# Patient Record
Sex: FEMALE | Race: ASIAN | Employment: PART TIME | URBAN - METROPOLITAN AREA
[De-identification: names, ages, dates, MRNs, and addresses within clinical notes are randomized per-mention and may not be internally consistent; named-entity substitution may affect disease eponyms.]

---

## 2017-02-18 PROCEDURE — 84144 ASSAY OF PROGESTERONE: CPT | Performed by: OBSTETRICS & GYNECOLOGY

## 2017-03-28 PROBLEM — Z98.891 PREVIOUS CESAREAN SECTION: Status: ACTIVE | Noted: 2017-03-28

## 2017-03-28 PROCEDURE — 87591 N.GONORRHOEAE DNA AMP PROB: CPT | Performed by: OBSTETRICS & GYNECOLOGY

## 2017-03-28 PROCEDURE — 36415 COLL VENOUS BLD VENIPUNCTURE: CPT | Performed by: OBSTETRICS & GYNECOLOGY

## 2017-03-28 PROCEDURE — 85025 COMPLETE CBC W/AUTO DIFF WBC: CPT | Performed by: OBSTETRICS & GYNECOLOGY

## 2017-03-28 PROCEDURE — 87491 CHLMYD TRACH DNA AMP PROBE: CPT | Performed by: OBSTETRICS & GYNECOLOGY

## 2017-03-28 PROCEDURE — 83036 HEMOGLOBIN GLYCOSYLATED A1C: CPT | Performed by: OBSTETRICS & GYNECOLOGY

## 2017-03-28 PROCEDURE — 86780 TREPONEMA PALLIDUM: CPT | Performed by: OBSTETRICS & GYNECOLOGY

## 2017-03-28 PROCEDURE — 86850 RBC ANTIBODY SCREEN: CPT | Performed by: OBSTETRICS & GYNECOLOGY

## 2017-03-28 PROCEDURE — 88175 CYTOPATH C/V AUTO FLUID REDO: CPT | Performed by: OBSTETRICS & GYNECOLOGY

## 2017-03-28 PROCEDURE — 86762 RUBELLA ANTIBODY: CPT | Performed by: OBSTETRICS & GYNECOLOGY

## 2017-03-28 PROCEDURE — 86900 BLOOD TYPING SEROLOGIC ABO: CPT | Performed by: OBSTETRICS & GYNECOLOGY

## 2017-03-28 PROCEDURE — 86901 BLOOD TYPING SEROLOGIC RH(D): CPT | Performed by: OBSTETRICS & GYNECOLOGY

## 2017-03-28 PROCEDURE — 87086 URINE CULTURE/COLONY COUNT: CPT | Performed by: OBSTETRICS & GYNECOLOGY

## 2017-03-28 PROCEDURE — 87389 HIV-1 AG W/HIV-1&-2 AB AG IA: CPT | Performed by: OBSTETRICS & GYNECOLOGY

## 2017-03-28 PROCEDURE — 87340 HEPATITIS B SURFACE AG IA: CPT | Performed by: OBSTETRICS & GYNECOLOGY

## 2017-07-25 PROCEDURE — 82950 GLUCOSE TEST: CPT | Performed by: OBSTETRICS & GYNECOLOGY

## 2017-07-25 PROCEDURE — 36415 COLL VENOUS BLD VENIPUNCTURE: CPT | Performed by: OBSTETRICS & GYNECOLOGY

## 2017-08-01 PROCEDURE — 82952 GTT-ADDED SAMPLES: CPT | Performed by: OBSTETRICS & GYNECOLOGY

## 2017-08-01 PROCEDURE — 82951 GLUCOSE TOLERANCE TEST (GTT): CPT | Performed by: OBSTETRICS & GYNECOLOGY

## 2017-08-01 PROCEDURE — 86780 TREPONEMA PALLIDUM: CPT | Performed by: OBSTETRICS & GYNECOLOGY

## 2017-08-23 PROBLEM — O24.410 DIET CONTROLLED GESTATIONAL DIABETES MELLITUS (GDM) IN THIRD TRIMESTER: Status: ACTIVE | Noted: 2017-08-23

## 2017-08-23 PROBLEM — O09.93 SUPERVISION OF HIGH RISK PREGNANCY IN THIRD TRIMESTER: Status: ACTIVE | Noted: 2017-08-23

## 2017-10-04 PROCEDURE — 87653 STREP B DNA AMP PROBE: CPT | Performed by: OBSTETRICS & GYNECOLOGY

## 2017-10-04 PROCEDURE — 87081 CULTURE SCREEN ONLY: CPT | Performed by: OBSTETRICS & GYNECOLOGY

## 2017-10-28 ENCOUNTER — ANESTHESIA (OUTPATIENT)
Dept: OBGYN UNIT | Facility: HOSPITAL | Age: 32
End: 2017-10-28

## 2017-10-28 ENCOUNTER — HOSPITAL ENCOUNTER (INPATIENT)
Facility: HOSPITAL | Age: 32
LOS: 2 days | Discharge: HOME | End: 2017-10-30
Attending: OBSTETRICS & GYNECOLOGY | Admitting: OBSTETRICS & GYNECOLOGY
Payer: COMMERCIAL

## 2017-10-28 ENCOUNTER — ANESTHESIA EVENT (OUTPATIENT)
Dept: OBGYN UNIT | Facility: HOSPITAL | Age: 32
End: 2017-10-28

## 2017-10-28 PROBLEM — Z34.90 PREGNANCY: Status: ACTIVE | Noted: 2017-10-28

## 2017-10-28 PROBLEM — Z34.90 PREGNANCY: Status: RESOLVED | Noted: 2017-10-28 | Resolved: 2017-10-28

## 2017-10-28 PROBLEM — O34.219 VBAC (VAGINAL BIRTH AFTER CESAREAN): Status: ACTIVE | Noted: 2017-10-28

## 2017-10-28 PROCEDURE — 86900 BLOOD TYPING SEROLOGIC ABO: CPT | Performed by: OBSTETRICS & GYNECOLOGY

## 2017-10-28 PROCEDURE — 86901 BLOOD TYPING SEROLOGIC RH(D): CPT | Performed by: OBSTETRICS & GYNECOLOGY

## 2017-10-28 PROCEDURE — 82962 GLUCOSE BLOOD TEST: CPT

## 2017-10-28 PROCEDURE — 86850 RBC ANTIBODY SCREEN: CPT | Performed by: OBSTETRICS & GYNECOLOGY

## 2017-10-28 PROCEDURE — 0KQM0ZZ REPAIR PERINEUM MUSCLE, OPEN APPROACH: ICD-10-PCS | Performed by: OBSTETRICS & GYNECOLOGY

## 2017-10-28 PROCEDURE — 36415 COLL VENOUS BLD VENIPUNCTURE: CPT

## 2017-10-28 PROCEDURE — 85027 COMPLETE CBC AUTOMATED: CPT | Performed by: OBSTETRICS & GYNECOLOGY

## 2017-10-28 PROCEDURE — 51701 INSERT BLADDER CATHETER: CPT

## 2017-10-28 RX ORDER — SODIUM CHLORIDE, SODIUM LACTATE, POTASSIUM CHLORIDE, CALCIUM CHLORIDE 600; 310; 30; 20 MG/100ML; MG/100ML; MG/100ML; MG/100ML
INJECTION, SOLUTION INTRAVENOUS CONTINUOUS
Status: DISCONTINUED | OUTPATIENT
Start: 2017-10-28 | End: 2017-10-28 | Stop reason: HOSPADM

## 2017-10-28 RX ORDER — NALBUPHINE HCL 10 MG/ML
2.5 AMPUL (ML) INJECTION
Status: DISCONTINUED | OUTPATIENT
Start: 2017-10-28 | End: 2017-10-30

## 2017-10-28 RX ORDER — LIDOCAINE HYDROCHLORIDE 10 MG/ML
INJECTION, SOLUTION EPIDURAL; INFILTRATION; INTRACAUDAL; PERINEURAL
Status: DISPENSED
Start: 2017-10-28 | End: 2017-10-29

## 2017-10-28 RX ORDER — LIDOCAINE HYDROCHLORIDE 10 MG/ML
INJECTION, SOLUTION EPIDURAL; INFILTRATION; INTRACAUDAL; PERINEURAL AS NEEDED
Status: DISCONTINUED | OUTPATIENT
Start: 2017-10-28 | End: 2017-10-28 | Stop reason: SURG

## 2017-10-28 RX ORDER — LIDOCAINE HYDROCHLORIDE 10 MG/ML
30 INJECTION, SOLUTION EPIDURAL; INFILTRATION; INTRACAUDAL; PERINEURAL ONCE
Status: DISCONTINUED | OUTPATIENT
Start: 2017-10-28 | End: 2017-10-28 | Stop reason: HOSPADM

## 2017-10-28 RX ORDER — SODIUM CHLORIDE, SODIUM LACTATE, POTASSIUM CHLORIDE, CALCIUM CHLORIDE 600; 310; 30; 20 MG/100ML; MG/100ML; MG/100ML; MG/100ML
INJECTION, SOLUTION INTRAVENOUS
Status: DISPENSED
Start: 2017-10-28 | End: 2017-10-29

## 2017-10-28 RX ORDER — ACETAMINOPHEN 325 MG/1
650 TABLET ORAL EVERY 6 HOURS PRN
Status: DISCONTINUED | OUTPATIENT
Start: 2017-10-28 | End: 2017-10-29

## 2017-10-28 RX ORDER — TERBUTALINE SULFATE 1 MG/ML
0.25 INJECTION, SOLUTION SUBCUTANEOUS AS NEEDED
Status: DISCONTINUED | OUTPATIENT
Start: 2017-10-28 | End: 2017-10-28 | Stop reason: HOSPADM

## 2017-10-28 RX ORDER — BUPIVACAINE HYDROCHLORIDE 2.5 MG/ML
INJECTION, SOLUTION EPIDURAL; INFILTRATION; INTRACAUDAL
Status: DISPENSED
Start: 2017-10-28 | End: 2017-10-29

## 2017-10-28 RX ORDER — SODIUM CHLORIDE 0.9 % (FLUSH) 0.9 %
10 SYRINGE (ML) INJECTION AS NEEDED
Status: DISCONTINUED | OUTPATIENT
Start: 2017-10-28 | End: 2017-10-28 | Stop reason: HOSPADM

## 2017-10-28 RX ORDER — DEXTROSE, SODIUM CHLORIDE, SODIUM LACTATE, POTASSIUM CHLORIDE, AND CALCIUM CHLORIDE 5; .6; .31; .03; .02 G/100ML; G/100ML; G/100ML; G/100ML; G/100ML
125 INJECTION, SOLUTION INTRAVENOUS CONTINUOUS
Status: DISCONTINUED | OUTPATIENT
Start: 2017-10-28 | End: 2017-10-28 | Stop reason: HOSPADM

## 2017-10-28 RX ORDER — AMMONIA INHALANTS 0.04 G/.3ML
0.3 INHALANT RESPIRATORY (INHALATION) AS NEEDED
Status: DISCONTINUED | OUTPATIENT
Start: 2017-10-28 | End: 2017-10-28 | Stop reason: HOSPADM

## 2017-10-28 RX ORDER — BUPIVACAINE HYDROCHLORIDE 2.5 MG/ML
INJECTION, SOLUTION EPIDURAL; INFILTRATION; INTRACAUDAL AS NEEDED
Status: DISCONTINUED | OUTPATIENT
Start: 2017-10-28 | End: 2017-10-28 | Stop reason: SURG

## 2017-10-28 RX ORDER — LIDOCAINE HYDROCHLORIDE AND EPINEPHRINE 15; 5 MG/ML; UG/ML
INJECTION, SOLUTION EPIDURAL AS NEEDED
Status: DISCONTINUED | OUTPATIENT
Start: 2017-10-28 | End: 2017-10-28 | Stop reason: SURG

## 2017-10-28 RX ORDER — SODIUM CHLORIDE, SODIUM LACTATE, POTASSIUM CHLORIDE, CALCIUM CHLORIDE 600; 310; 30; 20 MG/100ML; MG/100ML; MG/100ML; MG/100ML
INJECTION, SOLUTION INTRAVENOUS
Status: DISPENSED
Start: 2017-10-28 | End: 2017-10-28

## 2017-10-28 RX ORDER — IBUPROFEN 600 MG/1
600 TABLET ORAL ONCE AS NEEDED
Status: DISCONTINUED | OUTPATIENT
Start: 2017-10-28 | End: 2017-10-28 | Stop reason: HOSPADM

## 2017-10-28 RX ORDER — EPHEDRINE SULFATE/0.9% NACL/PF 25 MG/5 ML
5 SYRINGE (ML) INTRAVENOUS AS NEEDED
Status: DISCONTINUED | OUTPATIENT
Start: 2017-10-28 | End: 2017-10-29

## 2017-10-28 RX ORDER — TRISODIUM CITRATE DIHYDRATE AND CITRIC ACID MONOHYDRATE 500; 334 MG/5ML; MG/5ML
30 SOLUTION ORAL AS NEEDED
Status: DISCONTINUED | OUTPATIENT
Start: 2017-10-28 | End: 2017-10-28 | Stop reason: HOSPADM

## 2017-10-28 RX ADMIN — BUPIVACAINE HYDROCHLORIDE 0.2 ML: 2.5 INJECTION, SOLUTION EPIDURAL; INFILTRATION; INTRACAUDAL at 14:33:00

## 2017-10-28 RX ADMIN — SODIUM CHLORIDE, SODIUM LACTATE, POTASSIUM CHLORIDE, CALCIUM CHLORIDE: 600; 310; 30; 20 INJECTION, SOLUTION INTRAVENOUS at 14:28:00

## 2017-10-28 RX ADMIN — LIDOCAINE HYDROCHLORIDE 3 ML: 10 INJECTION, SOLUTION EPIDURAL; INFILTRATION; INTRACAUDAL; PERINEURAL at 14:33:00

## 2017-10-28 RX ADMIN — LIDOCAINE HYDROCHLORIDE AND EPINEPHRINE 3 ML: 15; 5 INJECTION, SOLUTION EPIDURAL at 14:35:00

## 2017-10-28 NOTE — ANESTHESIA PREPROCEDURE EVALUATION
Anesthesia PreOp Note    HPI:     Oscar Mccray is a 27year old female who presents for preoperative consultation requested by: * No surgeons listed *    Date of Surgery: 10/28/2017    * No procedures listed *  Indication: * No pre-op diagnosis entered * Test blood glucose daily times 4 and prn per drs orders Disp: 200 each Rfl: 3 Taking       Current Facility-Administered Medications Ordered in Epic:  Normal Saline Flush 0.9 % injection 10 mL 10 mL Intravenous PRN Allyn Sandifer, MD     dextrose 5 % Cherelle Tubbs fentaNYL 2mcg/ml & bupivacaine 1.25mg/ml epidural infusion  Epidural Continuous PRN Less, Sara Sesay MD Last Rate: 10 mL/hr at 10/28/17 1439 10 mL/hr at 10/28/17 1439   lactated ringers IV bolus 1,000 mL 1,000 mL Intravenous Once Less, Sara Sesay MD pressure is 118/54 and her pulse is 81.  Her respiration is 16.    10/28/17  1441 10/28/17  1443 10/28/17  1445 10/28/17  1446   BP: 125/84 113/58 118/54 118/54   BP Location:       Pulse: 91 111 81 81   Resp:       Temp:       TempSrc:            Anesthesi

## 2017-10-28 NOTE — H&P
[address] [facility] Patient Status:  Observation    1985 MRN [id number]   Location 9 [facility] Attending Ethan Flowers MD   Hosp Day # 0 PCP Melissa Giron MD     Date of Admission Disp:  Rfl:  10/27/2017 at Unknown time   Prenatal Vit-Fe Fumarate-FA (PRENATAL VITAMINS PLUS) 27-1 MG Oral Tab Take 1 tablet by mouth daily.  Disp:  Rfl:  10/27/2017 at Unknown time   Sapropterin Dihydrochloride (KUVAN) 100 MG Oral Powd Pack Take 100 mg by 181 140 - 400 K/UL   MPV 8.7 7.4 - 10.3 fL   -ABORH (BLOOD TYPE)   Collection Time: 10/28/17  5:44 AM   Result Value Ref Range   ABO BLOOD TYPE A    RH BLOOD TYPE Positive    -ANTIBODY SCREEN   Collection Time: 10/28/17  5:44 AM   Result Value Ref Range

## 2017-10-28 NOTE — ANESTHESIA PROCEDURE NOTES
Labor Analgesia  Performed by: LEATHA FAUST  Authorized by: LEATHA FAUST     Patient Location:  OB  Start Time:  10/28/2017 2:24 PM  End Time:  10/28/2017 2:39 PM  Site Identification: surface landmarks    Reason for Block: labor epidural    Anesthes

## 2017-10-28 NOTE — PROGRESS NOTES
[facility]    Labor Progress Note    Justin Ba Patient Status:  Observation    1985 MRN [id number]   Location [address]  Attending Mariah Mendoza MD   Hosp Day # 0 PCP Jeevan Womack MD       Subjective   I

## 2017-10-29 PROCEDURE — 85025 COMPLETE CBC W/AUTO DIFF WBC: CPT | Performed by: OBSTETRICS & GYNECOLOGY

## 2017-10-29 RX ORDER — IBUPROFEN 200 MG
200 TABLET ORAL EVERY 4 HOURS PRN
Status: DISCONTINUED | OUTPATIENT
Start: 2017-10-29 | End: 2017-10-30

## 2017-10-29 RX ORDER — SIMETHICONE 80 MG
80 TABLET,CHEWABLE ORAL 3 TIMES DAILY PRN
Status: DISCONTINUED | OUTPATIENT
Start: 2017-10-29 | End: 2017-10-30

## 2017-10-29 RX ORDER — IBUPROFEN 200 MG
400 TABLET ORAL EVERY 4 HOURS PRN
Status: DISCONTINUED | OUTPATIENT
Start: 2017-10-29 | End: 2017-10-30

## 2017-10-29 RX ORDER — ONDANSETRON 2 MG/ML
4 INJECTION INTRAMUSCULAR; INTRAVENOUS EVERY 6 HOURS PRN
Status: DISCONTINUED | OUTPATIENT
Start: 2017-10-29 | End: 2017-10-30

## 2017-10-29 RX ORDER — PRENATAL VIT,CAL 76/IRON/FOLIC 29 MG-1 MG
1 TABLET ORAL DAILY
Status: DISCONTINUED | OUTPATIENT
Start: 2017-10-29 | End: 2017-10-30

## 2017-10-29 RX ORDER — IBUPROFEN 600 MG/1
600 TABLET ORAL EVERY 4 HOURS PRN
Status: DISCONTINUED | OUTPATIENT
Start: 2017-10-29 | End: 2017-10-30

## 2017-10-29 RX ORDER — DOCUSATE SODIUM 100 MG/1
100 CAPSULE, LIQUID FILLED ORAL 2 TIMES DAILY
Status: DISCONTINUED | OUTPATIENT
Start: 2017-10-29 | End: 2017-10-30

## 2017-10-29 RX ORDER — SODIUM CHLORIDE 0.9 % (FLUSH) 0.9 %
10 SYRINGE (ML) INJECTION AS NEEDED
Status: DISCONTINUED | OUTPATIENT
Start: 2017-10-29 | End: 2017-10-30

## 2017-10-29 RX ORDER — AMMONIA INHALANTS 0.04 G/.3ML
0.3 INHALANT RESPIRATORY (INHALATION) AS NEEDED
Status: DISCONTINUED | OUTPATIENT
Start: 2017-10-29 | End: 2017-10-30

## 2017-10-29 RX ORDER — BISACODYL 10 MG
10 SUPPOSITORY, RECTAL RECTAL ONCE AS NEEDED
Status: DISCONTINUED | OUTPATIENT
Start: 2017-10-29 | End: 2017-10-30

## 2017-10-29 NOTE — L&D DELIVERY NOTE
Gala Rueda, Girl  [[id number]]     Labor Events     labor?:  No    steroids?:  None   Antibiotics received during labor?:  No   Rupture date:  10/28/17   Rupture time:  010   Rupture type:  SROM   Fluid color:  Clear   Augmentation:  Oxytocin   I effort: Total:                                                                 Apgars assigned by:  SHER ALONSO RN   Byron disposition:  with mother          Skin to Skin    Skin to skin initiation date/time: 10/28/17 1936   Skin to skin with:   Mother

## 2017-10-29 NOTE — PLAN OF CARE
ANXIETY    • Will report anxiety at manageable levels Progressing        DISCHARGE PLANNING    • Discharge to home or other facility with appropriate resources Progressing        PAIN - ADULT    • Verbalizes/displays adequate comfort level or patient's sta

## 2017-10-29 NOTE — PROGRESS NOTES
[facility]    OB/GYNE Progress Note      Bailey Channel Patient Status:  Inpatient    1985 MRN [id number]   Location [facility] 3SE Attending Roberta Suarez MD   Hosp Day # 1 PCP Destinee Vaughn MD       Assessment/Plan     Assessme

## 2017-10-30 VITALS
SYSTOLIC BLOOD PRESSURE: 119 MMHG | RESPIRATION RATE: 18 BRPM | HEART RATE: 64 BPM | TEMPERATURE: 98 F | DIASTOLIC BLOOD PRESSURE: 41 MMHG

## 2017-10-30 PROBLEM — O24.410 DIET CONTROLLED GESTATIONAL DIABETES MELLITUS (GDM) IN THIRD TRIMESTER: Status: RESOLVED | Noted: 2017-08-23 | Resolved: 2017-10-28

## 2017-10-30 PROBLEM — Z98.891 PREVIOUS CESAREAN SECTION: Status: RESOLVED | Noted: 2017-03-28 | Resolved: 2017-10-28

## 2017-10-30 PROBLEM — O34.219 VBAC (VAGINAL BIRTH AFTER CESAREAN): Status: RESOLVED | Noted: 2017-10-28 | Resolved: 2017-10-28

## 2017-10-30 RX ORDER — IBUPROFEN 600 MG/1
TABLET ORAL
Qty: 60 TABLET | Refills: 0 | Status: SHIPPED | COMMUNITY
Start: 2017-10-30 | End: 2020-12-15

## 2017-10-30 NOTE — DISCHARGE SUMMARY
[facility] - [facility]    Obstetrical Discharge Summary    Creston Felty Patient Status:  Inpatient    1985 MRN [id number]   Location [facility] 3SE Attending Edwige Carmona MD   [facility] Day # 2 PCP Edsi Gallegos MD     Date of Admission: 10/ deferred  Extremities: Homans sign is negative, no sign of DVT      Results:     Recent Results (from the past 336 hour(s))  -POCT GLUCOSE   Collection Time: 10/28/17  5:25 AM   Result Value Ref Range   POC Glucose  111 (H) 70 - 99   -CBC, PLATELET; NO DIF Location Status    [id number] 10/28/17  SECTION Ree Ivey MD [facility] L+D OR Not [place]          Pending Labs:     Discharge Plan:   Discharge Condition: Good    Discharge Meds: Current Discharge Medication List    New Orders    ibuprofen 600 MG Oral T

## 2017-10-30 NOTE — PROGRESS NOTES
Discharge order received from md. Postpartum discharge folder given, discharge medication form reviewed, signed, and given to patient. Id bands matched with baby band. Patient informed with to  Make follow appointment with ob.  Mother is interacting approp

## 2017-10-30 NOTE — PROGRESS NOTES
[facility] - [facility]    OB/Gyne Post  Progress Note      Creston Felty Patient Status:  Inpatient    1985 MRN [id number]   Location [facility] 3SE Attending Edwige Carmona MD   Hosp Day # 2 PCP MD Kevin Aguayo

## 2017-10-30 NOTE — PLAN OF CARE
ANXIETY    • Will report anxiety at manageable levels Completed        BIRTH - VAGINAL/ SECTION    • Fetal and maternal status remain reassuring during the birth process Completed        DISCHARGE PLANNING    • Discharge to home or other facility w

## 2017-11-14 NOTE — ANESTHESIA POSTPROCEDURE EVALUATION
Patient: Lillian Nichols    Procedure Summary     Date:  10/28/17 Room / Location:      Anesthesia Start:  1424 Anesthesia Stop:  1938    Procedure:  LABOR ANALGESIA Diagnosis:      Scheduled Providers:   Anesthesiologist:  Uvaldo Duarte MD    Anesthesia Type

## 2017-11-15 ENCOUNTER — NURSE ONLY (OUTPATIENT)
Dept: LACTATION | Facility: HOSPITAL | Age: 32
End: 2017-11-15
Payer: COMMERCIAL

## 2017-11-15 DIAGNOSIS — O92.79 DISORDER OF LACTATION, POSTPARTUM CONDITION OR COMPLICATION: Primary | ICD-10-CM

## 2017-11-15 PROCEDURE — 99213 OFFICE O/P EST LOW 20 MIN: CPT

## 2017-11-15 NOTE — PROGRESS NOTES
Mom is concerned about infant feeding at 332 weeks of age and about 8 ounces over birth weight. Initially mom had pain with feeding that was at its worst 10/10. At today's visit mom's nipples were healed and there was no pain with feeding.  Mom's difficul

## 2017-11-15 NOTE — PATIENT INSTRUCTIONS
Infant Discharge Feeding Plan -      Snuggle your baby in skin to skin contact between and during feedings whenever possible. Massage your breasts before nursing or pumping to soften areola if needed.     Breastfeed with hunger cues, most babies wi Do I need to pump my breasts? If supplementing:  · . Each day pump at least the same number of times you have needed to supplement that day. Remember the helpful website to improve your production that helps [email or web address]/[email or web address]

## 2018-02-01 ENCOUNTER — TELEPHONE (OUTPATIENT)
Dept: OBGYN UNIT | Facility: HOSPITAL | Age: 33
End: 2018-02-01

## 2018-02-01 RX ORDER — CLINDAMYCIN HYDROCHLORIDE 300 MG/1
300 CAPSULE ORAL 3 TIMES DAILY
Qty: 30 CAPSULE | Refills: 0 | Status: SHIPPED | OUTPATIENT
Start: 2018-02-01 | End: 2018-02-11

## 2018-02-02 NOTE — TELEPHONE ENCOUNTER
Having fever, flulike symptopms past 48 hours with tenderness on R breast.  Body Chills. Rx. Clindamycin  Instructed if not improving in 48 hours, should be seen by [facility]. Patient verbalized understanding; all questions answered.

## 2018-05-18 PROCEDURE — 84144 ASSAY OF PROGESTERONE: CPT | Performed by: OBSTETRICS & GYNECOLOGY

## 2019-12-12 ENCOUNTER — WALK IN (OUTPATIENT)
Dept: URGENT CARE | Age: 34
End: 2019-12-12

## 2019-12-12 VITALS
WEIGHT: 140 LBS | HEIGHT: 64 IN | RESPIRATION RATE: 15 BRPM | BODY MASS INDEX: 23.9 KG/M2 | SYSTOLIC BLOOD PRESSURE: 122 MMHG | DIASTOLIC BLOOD PRESSURE: 76 MMHG | TEMPERATURE: 98.1 F | HEART RATE: 80 BPM

## 2019-12-12 DIAGNOSIS — J02.9 SORE THROAT: Primary | ICD-10-CM

## 2019-12-12 DIAGNOSIS — J06.9 VIRAL URI: ICD-10-CM

## 2019-12-12 LAB
INTERNAL PROCEDURAL CONTROLS ACCEPTABLE: YES
S PYO AG THROAT QL IA.RAPID: NEGATIVE

## 2019-12-12 PROCEDURE — 99203 OFFICE O/P NEW LOW 30 MIN: CPT | Performed by: NURSE PRACTITIONER

## 2019-12-12 PROCEDURE — 87880 STREP A ASSAY W/OPTIC: CPT | Performed by: NURSE PRACTITIONER

## 2019-12-12 ASSESSMENT — ENCOUNTER SYMPTOMS
WHEEZING: 0
HEADACHES: 0
RHINORRHEA: 1
COUGH: 1
EYE ITCHING: 0
SORE THROAT: 1
CHILLS: 1
TROUBLE SWALLOWING: 0
PHOTOPHOBIA: 0
EYE PAIN: 0
SHORTNESS OF BREATH: 0
ABDOMINAL PAIN: 0
NAUSEA: 0
SINUS PAIN: 0
VOMITING: 0
DIARRHEA: 0
FATIGUE: 1
SWOLLEN GLANDS: 0
SINUS PRESSURE: 0
ACTIVITY CHANGE: 1
EYE REDNESS: 1
EYE DISCHARGE: 0

## 2021-06-04 PROBLEM — Z86.32 HISTORY OF GESTATIONAL DIABETES: Status: ACTIVE | Noted: 2021-06-04

## 2023-04-06 LAB
ANTIBODY SCREEN OB: NEGATIVE
HEPATITIS B SURFACE ANTIGEN OB: NEGATIVE
RAPID PLASMA REAGIN OB: NONREACTIVE

## 2023-08-21 LAB
AMB EXT TREPONEMAL ANTIBODIES: NONREACTIVE
HIV RESULT OB: NEGATIVE

## 2023-10-24 LAB — AMB EXT STREP B CULTURE: NEGATIVE

## 2023-11-11 ENCOUNTER — HOSPITAL ENCOUNTER (INPATIENT)
Facility: HOSPITAL | Age: 38
LOS: 1 days | Discharge: HOME | End: 2023-11-12
Attending: OBSTETRICS & GYNECOLOGY | Admitting: OBSTETRICS & GYNECOLOGY
Payer: COMMERCIAL

## 2023-11-11 ENCOUNTER — ANESTHESIA (OUTPATIENT)
Dept: OBGYN UNIT | Facility: HOSPITAL | Age: 38
End: 2023-11-11
Payer: COMMERCIAL

## 2023-11-11 ENCOUNTER — ANESTHESIA EVENT (OUTPATIENT)
Dept: OBGYN UNIT | Facility: HOSPITAL | Age: 38
End: 2023-11-11
Payer: COMMERCIAL

## 2023-11-11 PROBLEM — O26.93 PREGNANCY RELATED CONDITION IN THIRD TRIMESTER: Status: ACTIVE | Noted: 2023-11-11

## 2023-11-11 LAB
ANTIBODY SCREEN: NEGATIVE
BASOPHILS # BLD AUTO: 0.04 X10(3) UL (ref 0–0.2)
BASOPHILS NFR BLD AUTO: 0.4 %
DEPRECATED RDW RBC AUTO: 42.9 FL (ref 35.1–46.3)
EOSINOPHIL # BLD AUTO: 0.07 X10(3) UL (ref 0–0.7)
EOSINOPHIL NFR BLD AUTO: 0.7 %
ERYTHROCYTE [DISTWIDTH] IN BLOOD BY AUTOMATED COUNT: 13.2 % (ref 11–15)
GLUCOSE BLDC GLUCOMTR-MCNC: 91 MG/DL (ref 70–99)
HCT VFR BLD AUTO: 41.2 %
HGB BLD-MCNC: 13.8 G/DL
IMM GRANULOCYTES # BLD AUTO: 0.11 X10(3) UL (ref 0–1)
IMM GRANULOCYTES NFR BLD: 1.1 %
LYMPHOCYTES # BLD AUTO: 1.31 X10(3) UL (ref 1–4)
LYMPHOCYTES NFR BLD AUTO: 13.4 %
MCH RBC QN AUTO: 30 PG (ref 26–34)
MCHC RBC AUTO-ENTMCNC: 33.5 G/DL (ref 31–37)
MCV RBC AUTO: 89.6 FL
MONOCYTES # BLD AUTO: 0.78 X10(3) UL (ref 0.1–1)
MONOCYTES NFR BLD AUTO: 8 %
NEUTROPHILS # BLD AUTO: 7.5 X10 (3) UL (ref 1.5–7.7)
NEUTROPHILS # BLD AUTO: 7.5 X10(3) UL (ref 1.5–7.7)
NEUTROPHILS NFR BLD AUTO: 76.4 %
PLATELET # BLD AUTO: 187 10(3)UL (ref 150–450)
RBC # BLD AUTO: 4.6 X10(6)UL
RH BLOOD TYPE: POSITIVE
WBC # BLD AUTO: 9.8 X10(3) UL (ref 4–11)

## 2023-11-11 PROCEDURE — 0HQ9XZZ REPAIR PERINEUM SKIN, EXTERNAL APPROACH: ICD-10-PCS | Performed by: OBSTETRICS & GYNECOLOGY

## 2023-11-11 PROCEDURE — 86901 BLOOD TYPING SEROLOGIC RH(D): CPT | Performed by: OBSTETRICS & GYNECOLOGY

## 2023-11-11 PROCEDURE — 86850 RBC ANTIBODY SCREEN: CPT | Performed by: OBSTETRICS & GYNECOLOGY

## 2023-11-11 PROCEDURE — 85025 COMPLETE CBC W/AUTO DIFF WBC: CPT | Performed by: OBSTETRICS & GYNECOLOGY

## 2023-11-11 PROCEDURE — 10907ZC DRAINAGE OF AMNIOTIC FLUID, THERAPEUTIC FROM PRODUCTS OF CONCEPTION, VIA NATURAL OR ARTIFICIAL OPENING: ICD-10-PCS | Performed by: OBSTETRICS & GYNECOLOGY

## 2023-11-11 PROCEDURE — 82962 GLUCOSE BLOOD TEST: CPT

## 2023-11-11 PROCEDURE — 86900 BLOOD TYPING SEROLOGIC ABO: CPT | Performed by: OBSTETRICS & GYNECOLOGY

## 2023-11-11 PROCEDURE — 99214 OFFICE O/P EST MOD 30 MIN: CPT

## 2023-11-11 RX ORDER — ACETAMINOPHEN 500 MG
1000 TABLET ORAL EVERY 6 HOURS PRN
Status: DISCONTINUED | OUTPATIENT
Start: 2023-11-11 | End: 2023-11-12

## 2023-11-11 RX ORDER — ONDANSETRON 2 MG/ML
4 INJECTION INTRAMUSCULAR; INTRAVENOUS EVERY 6 HOURS PRN
Status: DISCONTINUED | OUTPATIENT
Start: 2023-11-11 | End: 2023-11-11 | Stop reason: HOSPADM

## 2023-11-11 RX ORDER — ACETAMINOPHEN 500 MG
500 TABLET ORAL EVERY 6 HOURS PRN
Status: DISCONTINUED | OUTPATIENT
Start: 2023-11-11 | End: 2023-11-11 | Stop reason: HOSPADM

## 2023-11-11 RX ORDER — BUPIVACAINE HYDROCHLORIDE 2.5 MG/ML
INJECTION, SOLUTION EPIDURAL; INFILTRATION; INTRACAUDAL
Status: COMPLETED | OUTPATIENT
Start: 2023-11-11 | End: 2023-11-11

## 2023-11-11 RX ORDER — SAPROPTERIN DIHYDROCHLORIDE 100 MG/1
TABLET ORAL
COMMUNITY
End: 2023-11-12

## 2023-11-11 RX ORDER — SIMETHICONE 80 MG
80 TABLET,CHEWABLE ORAL 3 TIMES DAILY PRN
Status: DISCONTINUED | OUTPATIENT
Start: 2023-11-11 | End: 2023-11-12

## 2023-11-11 RX ORDER — DOCUSATE SODIUM 100 MG/1
100 CAPSULE, LIQUID FILLED ORAL
Status: DISCONTINUED | OUTPATIENT
Start: 2023-11-11 | End: 2023-11-12

## 2023-11-11 RX ORDER — CITRIC ACID/SODIUM CITRATE 334-500MG
30 SOLUTION, ORAL ORAL AS NEEDED
Status: DISCONTINUED | OUTPATIENT
Start: 2023-11-11 | End: 2023-11-11 | Stop reason: HOSPADM

## 2023-11-11 RX ORDER — NALBUPHINE HYDROCHLORIDE 10 MG/ML
2.5 INJECTION, SOLUTION INTRAMUSCULAR; INTRAVENOUS; SUBCUTANEOUS
Status: DISCONTINUED | OUTPATIENT
Start: 2023-11-11 | End: 2023-11-12

## 2023-11-11 RX ORDER — BUPIVACAINE HCL/0.9 % NACL/PF 0.25 %
5 PLASTIC BAG, INJECTION (ML) EPIDURAL AS NEEDED
Status: DISCONTINUED | OUTPATIENT
Start: 2023-11-11 | End: 2023-11-12

## 2023-11-11 RX ORDER — AMMONIA INHALANTS 0.04 G/.3ML
0.3 INHALANT RESPIRATORY (INHALATION) AS NEEDED
Status: DISCONTINUED | OUTPATIENT
Start: 2023-11-11 | End: 2023-11-12

## 2023-11-11 RX ORDER — TERBUTALINE SULFATE 1 MG/ML
0.25 INJECTION, SOLUTION SUBCUTANEOUS AS NEEDED
Status: DISCONTINUED | OUTPATIENT
Start: 2023-11-11 | End: 2023-11-11 | Stop reason: HOSPADM

## 2023-11-11 RX ORDER — LIDOCAINE HYDROCHLORIDE 10 MG/ML
INJECTION, SOLUTION INFILTRATION; PERINEURAL
Status: COMPLETED | OUTPATIENT
Start: 2023-11-11 | End: 2023-11-11

## 2023-11-11 RX ORDER — ONDANSETRON 2 MG/ML
4 INJECTION INTRAMUSCULAR; INTRAVENOUS EVERY 6 HOURS PRN
Status: DISCONTINUED | OUTPATIENT
Start: 2023-11-11 | End: 2023-11-12

## 2023-11-11 RX ORDER — IBUPROFEN 600 MG/1
600 TABLET ORAL EVERY 6 HOURS
Status: DISCONTINUED | OUTPATIENT
Start: 2023-11-11 | End: 2023-11-12

## 2023-11-11 RX ORDER — ACETAMINOPHEN 500 MG
500 TABLET ORAL EVERY 6 HOURS PRN
Status: DISCONTINUED | OUTPATIENT
Start: 2023-11-11 | End: 2023-11-12

## 2023-11-11 RX ORDER — DEXTROSE, SODIUM CHLORIDE, SODIUM LACTATE, POTASSIUM CHLORIDE, AND CALCIUM CHLORIDE 5; .6; .31; .03; .02 G/100ML; G/100ML; G/100ML; G/100ML; G/100ML
INJECTION, SOLUTION INTRAVENOUS CONTINUOUS
Status: DISCONTINUED | OUTPATIENT
Start: 2023-11-11 | End: 2023-11-11 | Stop reason: HOSPADM

## 2023-11-11 RX ORDER — LIDOCAINE HYDROCHLORIDE AND EPINEPHRINE 15; 5 MG/ML; UG/ML
INJECTION, SOLUTION EPIDURAL
Status: COMPLETED | OUTPATIENT
Start: 2023-11-11 | End: 2023-11-11

## 2023-11-11 RX ORDER — BISACODYL 10 MG
10 SUPPOSITORY, RECTAL RECTAL ONCE AS NEEDED
Status: DISCONTINUED | OUTPATIENT
Start: 2023-11-11 | End: 2023-11-12

## 2023-11-11 RX ORDER — BUPIVACAINE HYDROCHLORIDE 2.5 MG/ML
20 INJECTION, SOLUTION EPIDURAL; INFILTRATION; INTRACAUDAL ONCE
Status: DISCONTINUED | OUTPATIENT
Start: 2023-11-11 | End: 2023-11-11 | Stop reason: HOSPADM

## 2023-11-11 RX ORDER — SODIUM CHLORIDE, SODIUM LACTATE, POTASSIUM CHLORIDE, CALCIUM CHLORIDE 600; 310; 30; 20 MG/100ML; MG/100ML; MG/100ML; MG/100ML
INJECTION, SOLUTION INTRAVENOUS AS NEEDED
Status: DISCONTINUED | OUTPATIENT
Start: 2023-11-11 | End: 2023-11-11 | Stop reason: HOSPADM

## 2023-11-11 RX ORDER — DIAPER,BRIEF,INFANT-TODD,DISP
1 EACH MISCELLANEOUS EVERY 6 HOURS PRN
Status: DISCONTINUED | OUTPATIENT
Start: 2023-11-11 | End: 2023-11-12

## 2023-11-11 RX ORDER — IBUPROFEN 600 MG/1
600 TABLET ORAL ONCE AS NEEDED
Status: DISCONTINUED | OUTPATIENT
Start: 2023-11-11 | End: 2023-11-11 | Stop reason: HOSPADM

## 2023-11-11 RX ORDER — LIDOCAINE HYDROCHLORIDE 10 MG/ML
30 INJECTION, SOLUTION EPIDURAL; INFILTRATION; INTRACAUDAL; PERINEURAL ONCE
Status: DISCONTINUED | OUTPATIENT
Start: 2023-11-11 | End: 2023-11-11 | Stop reason: HOSPADM

## 2023-11-11 RX ORDER — ACETAMINOPHEN 500 MG
1000 TABLET ORAL EVERY 6 HOURS PRN
Status: DISCONTINUED | OUTPATIENT
Start: 2023-11-11 | End: 2023-11-11 | Stop reason: HOSPADM

## 2023-11-11 RX ORDER — CHOLECALCIFEROL (VITAMIN D3) 25 MCG
1 TABLET,CHEWABLE ORAL DAILY
COMMUNITY

## 2023-11-11 RX ADMIN — LIDOCAINE HYDROCHLORIDE 3 ML: 10 INJECTION, SOLUTION INFILTRATION; PERINEURAL at 05:43:00

## 2023-11-11 RX ADMIN — BUPIVACAINE HYDROCHLORIDE 3 ML: 2.5 INJECTION, SOLUTION EPIDURAL; INFILTRATION; INTRACAUDAL at 05:53:00

## 2023-11-11 RX ADMIN — LIDOCAINE HYDROCHLORIDE AND EPINEPHRINE 5 ML: 15; 5 INJECTION, SOLUTION EPIDURAL at 05:49:00

## 2023-11-11 NOTE — L&D DELIVERY NOTE
Lorrie Downs [[id number]]      Labor Events     labor?: No   steroids?: None  Rupture date/time: 202318     Rupture type: AROM  Fluid color: Clear  Labor type: Spontaneous Onset of Labor  Augmentation: AROM  Indications for augmentation: Ineffective Contraction Pattern  Intrapartum & labor complications:  Other - see comments  Intrapartum & labor complications comment: A1 GDM       Labor Event Times    Labor onset date/time: 2023 020  Dilation complete date/time: 2023        Presentation    Presentation: Vertex  Position: Occiput Anterior       Operative Delivery    Operative Vaginal Delivery: No                      Shoulder Dystocia    Shoulder Dystocia: No             Anesthesia    Method: Epidural              [place] Delivery      Head delivery date/time: 2023 07:50:59   Delivery date/time:  23 07:51:23   Delivery type: Normal spontaneous vaginal delivery    Details:     Delivery location: delivery room  Delivery Room Temperature: 74       Delivery Providers    Delivering Clinician: Cl Rinaldi MD   Delivery personnel:  Provider Role   Ramone Bonner, RN Baby Nurse   Serafin Burgos, RN Delivery Nurse   Georgette Oden Surgical Tech             Cord    Vessels: 3 Vessels  Complications: None  Timed cord clamping: Yes  Time in sec: 180  Cord blood disposition: to lab  Gases sent?: No       Resuscitation    Method: None       [place] Measurements    No data filed       Placenta    Date/time: 2023  Removal: Manual Removal  Appearance: Intact  Disposition: Discarded       Apgars    Living status: Living   Apgar Scoring Key:    0 1 2    Skin color Blue or pale Acrocyanotic Completely pink    Heart rate Absent <100 bpm >100 bpm    Reflex irritability No response Grimace Cry or active withdrawal    Muscle tone Limp Some flexion Active motion    Respiratory effort Absent Weak cry; hypoventilation Good, crying              1 Minute:  5 Minute:  10 Minute:  15 Minute:  20 Minute:      Skin color:        Heart rate:        Reflex irritablity:        Muscle tone:        Respiratory effort: Total:            disposition: with mother       Skin to Skin    No data filed       Vaginal Count    Initial count RN: Donavan Martinez RN  Initial count Tech: Drenda Riling   Sponges   Sharps    Initial counts 10   0    Final counts        Final count RN: Donavan Martinez RN       Delivery (Maternal)    Episiotomy: None            Fetal vertex delivered over intact perineum. No nuchal cord. Remainder of infant delivered without difficulty. Placenta eventually delivered spontaneously after 20+ minutes. Inspection of perineum revealed a 1st degree laceration repaired with 3-0 Chromic.

## 2023-11-11 NOTE — LACTATION NOTE
LACTATION NOTE - MOTHER      Evaluation Type: Inpatient    Problems identified  Problems identified: Knowledge deficit  Problems Identified Other: patient asking to be seen at 4 hours pp    Maternal history  Maternal history: AMA; Gestational diabetes    Breastfeeding goal  Breastfeeding goal: To maintain breast milk feeding per patient goal    Maternal Assessment  Bilateral Breasts: Soft;Symmetrical  Bilateral Nipples: WNL  Prior breastfeeding experience (comment below): Multip; Successful  Breastfeeding Assistance: Breastfeeding assistance provided with permission    Pain assessment  Location/Comment: denies                     Mom asking for assistance, infant showing feeding cues. Mom was hand expressing drops, and baby had a few sucks, then a very active latch with swallows heard.  Mom states will call for more assistance later today

## 2023-11-11 NOTE — ANESTHESIA PROCEDURE NOTES
Labor Analgesia    Date/Time: 11/11/2023 5:43 AM    Performed by: Zoey Garcia MD  Authorized by: Zoey Garcia MD      General Information and Staff    Start Time:  11/11/2023 5:43 AM  End Time:  11/11/2023 5:59 AM  Anesthesiologist:  Zoey Garcia MD  Performed by:   Anesthesiologist  Patient Location:  OB  Reason for Block: labor epidural    Preanesthetic Checklist: patient identified, IV checked, site marked, risks and benefits discussed, monitors and equipment checked, pre-op evaluation, timeout performed, anesthesia consent and sterile technique used      Procedure Details    Patient Position:  Sitting  Prep: ChloraPrep    Monitoring:  Heart rate  Approach:  Midline    Epidural Needle    Injection Technique:  MIGUEL air  Needle Type:  Tuohy  Needle Gauge:  18 G  Needle Length:  3.5 in  Location:  L2-3    Spinal Needle      Catheter    Catheter Type:  Multi-orifice  Catheter Size:  20 G  Test Dose:  Negative    Assessment      Additional Comments

## 2023-11-11 NOTE — PROGRESS NOTES
Pt. is a 44year old female admitted to [facility]/R3-A. Chief Complaint   Patient presents with    R/o Labor     Contractions since 2 A. M. Pt. is [id number] 39w3d intra-uterine pregnancy. History obtained, consents signed. Oriented to room, staff, and plan of care.

## 2023-11-11 NOTE — PROGRESS NOTES
Pt is a 44year old female admitted to TR1/TR1-A. Chief Complaint   Patient presents with    R/o Labor     Contractions since 2 am      Pt is [id number] Unknown intra-uterine pregnancy. History obtained, consents signed. Oriented to room, staff, and plan of care.

## 2023-11-12 VITALS
DIASTOLIC BLOOD PRESSURE: 65 MMHG | OXYGEN SATURATION: 99 % | TEMPERATURE: 100 F | RESPIRATION RATE: 16 BRPM | HEIGHT: 64 IN | BODY MASS INDEX: 28.17 KG/M2 | HEART RATE: 68 BPM | SYSTOLIC BLOOD PRESSURE: 112 MMHG | WEIGHT: 165 LBS

## 2023-11-12 LAB
BASOPHILS # BLD AUTO: 0.07 X10(3) UL (ref 0–0.2)
BASOPHILS NFR BLD AUTO: 0.8 %
DEPRECATED RDW RBC AUTO: 43.8 FL (ref 35.1–46.3)
EOSINOPHIL # BLD AUTO: 0.15 X10(3) UL (ref 0–0.7)
EOSINOPHIL NFR BLD AUTO: 1.7 %
ERYTHROCYTE [DISTWIDTH] IN BLOOD BY AUTOMATED COUNT: 13.2 % (ref 11–15)
HCT VFR BLD AUTO: 35.6 %
HGB BLD-MCNC: 11.9 G/DL
IMM GRANULOCYTES # BLD AUTO: 0.14 X10(3) UL (ref 0–1)
IMM GRANULOCYTES NFR BLD: 1.5 %
LYMPHOCYTES # BLD AUTO: 1.69 X10(3) UL (ref 1–4)
LYMPHOCYTES NFR BLD AUTO: 18.7 %
MCH RBC QN AUTO: 30.1 PG (ref 26–34)
MCHC RBC AUTO-ENTMCNC: 33.4 G/DL (ref 31–37)
MCV RBC AUTO: 89.9 FL
MONOCYTES # BLD AUTO: 0.87 X10(3) UL (ref 0.1–1)
MONOCYTES NFR BLD AUTO: 9.6 %
NEUTROPHILS # BLD AUTO: 6.14 X10 (3) UL (ref 1.5–7.7)
NEUTROPHILS # BLD AUTO: 6.14 X10(3) UL (ref 1.5–7.7)
NEUTROPHILS NFR BLD AUTO: 67.7 %
PLATELET # BLD AUTO: 167 10(3)UL (ref 150–450)
RBC # BLD AUTO: 3.96 X10(6)UL
WBC # BLD AUTO: 9.1 X10(3) UL (ref 4–11)

## 2023-11-12 PROCEDURE — 85025 COMPLETE CBC W/AUTO DIFF WBC: CPT | Performed by: OBSTETRICS & GYNECOLOGY

## 2023-11-12 RX ORDER — ACETAMINOPHEN 500 MG
1000 TABLET ORAL EVERY 6 HOURS PRN
Status: SHIPPED | COMMUNITY
Start: 2023-11-12

## 2023-11-12 RX ORDER — IBUPROFEN 600 MG/1
600 TABLET ORAL EVERY 6 HOURS
Status: SHIPPED | COMMUNITY
Start: 2023-11-12

## 2023-11-12 NOTE — LACTATION NOTE
This note was copied from a baby's chart. LACTATION NOTE - INFANT    Evaluation Type  Evaluation Type: Inpatient    Problems & Assessment  Problems Diagnosed or Identified: Sleepy  Infant Assessment: Hunger cues present  Muscle tone: Appropriate for GA    Feeding Assessment  Summary Current Feeding: Breastfeeding exclusively  Breastfeeding Assessment: Assisted with breastfeeding w/mother's permission  Breastfeeding Positions: cross cradle;laid back;football;right breast;left breast  Latch: Repeated attempts, hold nipple in mouth, stimulate to suck  Audible Sucks/Swallows:  A few with stimulation  Type of Nipple: Everted (after stimulation)  Comfort (Breast/Nipple): Soft/non-tender  Hold (Positioning): Full assist, teach one side, mother does other, staff holds  [facility] Score: 7

## 2023-11-12 NOTE — LACTATION NOTE
23 1100   Evaluation Type   Evaluation Type Inpatient   Problems identified   Problems identified Knowledge deficit   Maternal history   Maternal history AMA   Other/comment , hx of  for breech   Breastfeeding goal   Breastfeeding goal To maintain breast milk feeding per patient goal   Maternal Assessment   Prior breastfeeding experience (comment below) Multip; Successful   Breastfeeding Assistance Breastfeeding assistance provided with permission   Pain assessment   Location/Comment denies   Treatment of Sore Nipples Lanolin   Guidelines for use of: Other (comment) Mom states that infant is nursing so much better today and they are going home, encouraged to call the [place] [phone] breastfeeding clinic if needed.

## 2023-11-12 NOTE — LACTATION NOTE
LACTATION NOTE - MOTHER      Evaluation Type: Inpatient    Problems identified  Problems identified: Knowledge deficit  Problems Identified Other: patient asking to be seen at 4 hours pp    Maternal history  Maternal history: AMA    Breastfeeding goal  Breastfeeding goal: To maintain breast milk feeding per patient goal    Maternal Assessment  Bilateral Breasts: Soft;Symmetrical  Bilateral Nipples: WNL  Prior breastfeeding experience (comment below): Multip; Successful  Breastfeeding Assistance: Breastfeeding assistance provided with permission    Pain assessment  Location/Comment: denies  Treatment of Sore Nipples: Lanolin                   Baby more fussy with this feeding. He was not staying latched for long, few sucks then slip off. We tried a few different positions on each side, and mom topped off with drops from HE and spoon feeding. Baby had a few sucks and few swallows heard.

## 2023-11-12 NOTE — DISCHARGE SUMMARY
[facility]  Discharge Summary    Abiodun Ashton Patient Status:  Inpatient    1985 MRN [id number]   Location [facility] 3SE Attending Pat Tapia MD   Hosp Day # 1 PCP Piedad Arreola MD     Date of Admission: 2023    Date of Discharge: 23    Admitting Diagnosis: pregnancy  Pregnancy related condition in third trimester    Discharge Diagnosis:   Patient Active Problem List   Diagnosis    History of gestational diabetes    Pregnancy related condition in third trimester       Reason for Admission: [address] Course: Presented in labor, delivered a baby boy    Procedures: Vaginal delivery    Complications: None    Disposition: Home or Self Care    Discharge Condition: Good    Discharge Medications:   Current Discharge Medication List        START taking these medications    Details   acetaminophen 500 MG Oral Tab Take 2 tablets (1,000 mg total) by mouth every 6 (six) hours as needed. ibuprofen 600 MG Oral Tab Take 1 tablet (600 mg total) by mouth every 6 (six) hours. CONTINUE these medications which have NOT CHANGED    Details   prenatal vitamin with DHA 27-0.8-228 MG Oral Cap Take 1 capsule by mouth daily. STOP taking these medications       Sapropterin Dihydrochloride 100 MG Oral Tab        Levonorgestrel-Ethinyl Estrad (LARISSIA) 0.1-20 MG-MCG Oral Tab        Cholecalciferol (VITAMIN D) 50 MCG (2000 UT) Oral Tab        Ketoconazole 1 % External Shampoo              Follow up Visits:  Follow-up with primary ob/gyn in 6 weeks    Other Discharge Instructions: none    Nicole Ledesma MD  2023  7:13 AM

## 2023-11-21 ENCOUNTER — NURSE ONLY (OUTPATIENT)
Dept: LACTATION | Facility: HOSPITAL | Age: 38
End: 2023-11-21
Attending: OBSTETRICS & GYNECOLOGY
Payer: COMMERCIAL

## 2023-11-21 DIAGNOSIS — Z91.89 BREASTFEEDING PROBLEM: Primary | ICD-10-CM

## 2023-11-21 PROCEDURE — 99214 OFFICE O/P EST MOD 30 MIN: CPT

## 2023-11-21 NOTE — PATIENT INSTRUCTIONS
Breastfeeding suggestions when supplements may be needed    Kangaroo mother care: Snuggle your baby between your breasts in just a diaper and covered with a blanket. Helps to wake a sleepy baby and increases your milk supply. Massage your breasts before nursing or pumping. Breastfeed with hunger cues, most babies will breastfeed 8-12 times every 24 hours with some cluster feeding, especially during growth spurts. Gently wake by 2-3 hours to feed if sleepy. Positioning: Your hand at neck/shoulders, not the back of head. Line chin with the bottom of your areola    Latching on:  Express drops of milk onto your baby's lips to encourage licking. Point your nipple to baby's nose  Stroke nipple lightly down center of lips  Wait for wide mouth with tongue cupped at bottom of mouth. Chin should be deep into breast, with some room between nose and the breast.   If needed, gently draw chin down lower to deepen latch. Is baby taking enough breastmilk? Swallowing with most sucks (every 1-3 sucks) until satisfied at least 8-12 times every 24 hours. Compressing the breast when your baby sucks can increase milk flow. At least 6-8 wet diapers and at least 3-4 soft, yellow seedy stools every 24 hours. Use breastfeeding journal.  Weight gain of at least 4-7 ounces per week    Supplementation:   If not meeting these guidelines for adequate breastfeeding, feed 1-2 oz or more expressed milk or formula with a slow flow bottle     Paced bottle feeding using a slow flow nipple:   Hold your baby in an upright position, supporting the hand and neck with your hand, rather than in the crook of your arm. Let you baby \"latch on\" to bottle: stroke nipple down from top lip to bottom, licking is good, wait for wide mouth, tongue cupped at bottom of mouth. Tip the bottle up just far enough that there is not air in the nipple.   Pausing mimics breastfeeding and discourages \"guzzling\" the feeding, allowing infant to take at least 15 minutes to drink the breastmilk or formula. Milk Supply:   Continue breast massage before nursing and pumping, skin to skin contact with baby as much as possible. Continue to pump one or both breasts for 10-15 minutes every 2-3 hours after nursing. (at least 8x/24 hours). If milk supply is not responding to above measures within the week:  Discuss use of herbs such as fenugreek  or medications such as reglan or domperidone with your OB physician and baby's physician. Discuss thyroid check with OB physician     PUMP AND BOTTLE FEED some feedings is recommended at this time, to ensure adequate stimulation for maternal milk production and adequate milk transfer - may alternate every other feeding. Prevent plugged ducts and mastitis  Watch for signs of breast infection (mastitis) - painful breast, reddened area, fever, chills or flu-like symptoms - call your OB doctor at once if this occurs. Follow-up:  Call and schedule a follow-up lactation consult - this appt likely best timed after consultation with myofunctional therapy/peds/other providers for evaluation. Appointment with baby's doctor planned - CALL TODAY re:no stools since Friday and to discuss infant oral evaluation. Call you baby's doctor with questions as well. Weight checks with pediatrician - CALL TODAY re: no stools since 11-17-23 and for further advisement/ weight checks. For additional information: [employer] [place] website  [email or web address]    [email or web address]

## 2023-11-21 NOTE — PROGRESS NOTES
LACTATION NOTE - MOTHER      Evaluation Type: Inpatient    Problems identified  Problems identified: Knowledge deficit; Nipple pain    Maternal history  Maternal history: AMA  Other/comment: , hx of  for breech    Breastfeeding goal  Breastfeeding goal: To maintain breast milk feeding per patient goal    Maternal Assessment  Bilateral Breasts: Symmetrical;Full  Bilateral Nipples: Long;Large;Everted (measured for flanges -)  Right Nipple:  (26 mm size- 28-29 mm flange)  Left Nipple:  (24 mm size  - 26-27 mm flange)  Prior breastfeeding experience (comment below): Multip; Successful  Prior BF experience: comment: 14 mos exclusive breastfeedng with a good milk supply- and 11 mos exclusive breastfeeding with a good milk supply  Breastfeeding Assistance: Breastfeeding assistance provided with permission;Breast exam provided with permission    Pain assessment  Pain, additional: Pain location  Pain Location: Nipples  Location/Comment: some nipple pain with latch - reports this feels differenct  Pain scale comment: nipple comfort a bit better with use of asymmetrical latch technique - but later becomes more shallow  Treatment of Sore Nipples: Deeper latch techniques; Expressed breast milk; Lanolin    Guidelines for use of:  Breast pump type: Hand Pump;Spectra (prefers to use the hand pump)  Suggested use of pump: Pump 8-12X/24hr (triple feeding temporary feeding plan OR pump and bottle feed until a better milk transfer/ breast feeding can be appreciated in a follow up LC OPV)  Reported pumping volumes (ml): 1 ounce if after a feeding 2-2.5 onces if in lieu of a latched feeding  Post-feed pumped volume: 2-2.5 onces if in lieu of a latched feeding    ( total 28 mls afer todays LC OPV feeding)  Other (comment): Lorrie Biggs has successfully  her two other children with a good milk supply and no issues, but reports that 'this is different' and Dorothea Carlin had a 9% weight loss.  Also reports Dorothea Carlin has not had a stool since Friday 11-. Afia Perez had an initial peds visit wt loss of 9% on day 2 of life and then no change on day 5 of life 11-15-23 and initiated supplements until 11-17-23 when mother reports \"milk is in\". Today, day 10 of life, Mir Godinez is at a 1.4 % weight loss. Mother opted to pump and bottle feed at night and latch during the day as Mir Godinez was better satiated after the pump and bottle feding. Since 11-17 however Mir Godinez has not had any stools. Discussed I and O expected daily, recommended reporting the no stolls to pediatrician Dr Bobby Juan. Also, recommended, due to the low milk transfer, 'triple feeding all breast feedings' and likely a wise plan to pump and bottle feed alternatively. Discussed feeding to satiety but a likely feeding volume 2-2.5 ounces for his weight and age ( 10 days). It is noted that Mir Godinez has a lower tongue posture, low milk transfer from a latched breast feeding, he has a persistant upper lip blister, and mother reports the upper lip never flanges up. His suck pattern is \"chompy\" and feels 'different\" to his mother who  2 other children for a bout a year each with a good supply and no issues. This raises some suspicion for oral restriction interfering with breastfeeding/milk transfer. Recommeded to discuss with HCP and consider an oral evaluation with a myofuctional therapist for futehr functional evaluation of the tongue. Mother plans to discuss lack of stools since 11-17-23 and discuss an oral evaluation with pediatrician today for further advisement. Also, will supplement all feeding to satiety that are  ( triple feeding) and/or opt to pump and bottle feed some feedings to ensure adequate maternal breast stimulation and feeding volume for baby.  To call for follow up feeding evaluation after further oral evaluation, future weight checks with pediatrian

## 2023-12-27 ENCOUNTER — TELEPHONE (OUTPATIENT)
Dept: OBGYN UNIT | Facility: HOSPITAL | Age: 38
End: 2023-12-27